# Patient Record
(demographics unavailable — no encounter records)

---

## 2024-11-12 NOTE — PROCEDURE
[FreeTextEntry3] :  PRE-OP DIAGNOSES ICD-10 Diagnosis Codes 	Primary Diagnosis - J34.89 Rhinorrhea  - J34.9 (unspecified disorders of the nose or sinuses) - R09.81 nasal congestion - vasomotor rhinitis - BITH   PROCEDURES PERFORMED:   1.	Destruction of intranasal tissue by radiofrequency, internal approach (CPT-4 05225) 2.	Ablation, soft tissue of inferior turbinates, unilateral or bilateral, by radiofrequency; superficial (CPT-4 93796) 3.        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  DESCRIPTION OF OPERATION: After obtaining informed consent from the patient, Patient was brought to the procedure room Two methods were used to verify patient identity. A time-out was taken as per protocol. Topical decongestant was applied bilaterally intranasally. A zero-degree 4mm rigid endoscope R17 was used to examine the left and right nasal cavities. The nasal valve areas were examined for abnormalities. The inferior and middle turbinates were evaluated. The middle and superior meati and the sphenoethmoid recesses were examined.  Bilateral spray 1:1 Oxymetazoline and 4% lidocaine. sphenopalatine area then injected with 1% lido, 1:200k epi 1 cc each side, drawing back b/l to assure not in the artery   large septal perf.  Starting with the Right side, the treatment area at the point of attachment of the middle turbinate to the lateral wall was visualized. Under nasal endoscopy the radiofrequency stylus previously prepped with conductive media was advanced and placed on the mucosal surface, just anterior to the region of the sphenopalatine foramen located in the posterior aspect of the middle meatus, on the lateral nasal wall.   The radiofrequency stylus was placed with gentle pressure against the treatment zone and activated to induce destruction of the target tissue. After removing the stylus the treated area was endoscopically inspected and a blanched lesion was observed indicating the destruction of the tissue. The radiofrequency stylus was applied two more times in adjacent, non-overlapping locations along the inferior turbinate for each treatment cycle.  Attention was then turned to the left side. As the anesthetic agents had been previously administered and were deemed effective, we were able to proceed directly with the second procedure. The radiofrequency stylus was inspected and re-prepped. and the treatment area at the point of attachment of the middle turbinate to the lateral wall was visualized.  Under nasal endoscopy the radiofrequency stylus previously prepped with conductive media was advanced and placed on the mucosal surface, just anterior to the region of the sphenopalatine foramen located in the posterior aspect of the middle meatus, on the lateral nasal wall.   The radiofrequency stylus was placed with gentle pressure against the treatment zone and activated to induce destruction of the target tissue. After removing the stylus the treated area was endoscopically inspected and a blanched lesion was observed indicating the destruction of the tissue. The radiofrequency stylus was applied two more times in adjacent, non-overlapping locations along the inferior turbinate for each treatment cycle.  The patient tolerated the procedures without any complications. After adequate recovery time, a post-procedure assessment was completed and the patient was returned safely to ambulatory status as per protocol. Post-procedure instructions were provided to patients surrogate and the patient; these were understood and a copy provided prior to discharge. Patient to return to clinic in 7 days or sooner as needed.

## 2024-11-25 NOTE — ASSESSMENT
[FreeTextEntry1] : 64 year old male following up s/p rhinaer on 11/12/24. The patient was debrided bilaterally with rigid suction as a result of nasal crusting. breathing much improved after detriment. Patient should continue to avoid nose blowing, heavy lifting or exercising, and should start a regimen of over the counter nasal saline spray for moisturization of the nasal cavities.  - will follow up in 1 month to assure no scarring, will monitor response  - nasal irrigation  - continue Flonase

## 2024-11-25 NOTE — PROCEDURE
[FreeTextEntry3] : procedure - bilateral endoscopic nasal debridement Dx - crusting Verbal consent obtained - discussed risks and benefits of intervention before proceeding  Bilateral nasal cavities inspected with #0 ridged sinus endoscope. septum appeared midline and turbinates well reduced. bilateral middle meatuses were explored and suction and agitator were used to open ostiums and open any forming scar bands. all sinuses were explored and open at end of procedure

## 2024-11-25 NOTE — CONSULT LETTER
[FreeTextEntry1] : Dear Dr. SIOBHAN LEDEZMA  I had the pleasure of evaluating your patient MARIANNA CHAPA, thank you for allowing us to participate in their care. please see full note detailing our visit below. If you have any questions, please do not hesitate to call me and I would be happy to discuss further.   Augie Villanueva M.D. Attending Physician,   Department of Otolaryngology - Head and Neck Surgery [facility]  Office: [phone] Fax: [phone]

## 2024-11-25 NOTE — HISTORY OF PRESENT ILLNESS
[de-identified] : 64 year old male presents s/p rhinaer on 11/12/24. Doing well, states breathing is a little stuffy at times. Still with runny nose worse on left side. Using saline 2-3 times a day.

## 2024-11-25 NOTE — END OF VISIT
[FreeTextEntry3] : I personally saw and examined  the patient in detail.  I spoke to NANDINI Gomez regarding the assessment and plan of care. I performed the procedures and relevant physical exam.  I have reviewed the above assessment and plan of care and I agree.  I have made changes to the body of the note wherever necessary and appropriate

## 2024-12-12 NOTE — DISCUSSION/SUMMARY
[FreeTextEntry1] : ET with likely underlying exacerbation from psychotropics, trazodone and recent stressors. the reduction in psychotropics have yielded reduction in overall tremor amp/frequency.  Tics are stable  Patient was counseled on the following recommendations:  cont primidone 250/150 cont TPM 50mg qd    guanfacine  to 2 mg ER qd  f/u 4months
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
## 2024-12-12 NOTE — PHYSICAL EXAM
[FreeTextEntry1] : No tongue clicks.  There are no rest tremor. There are mild postural hand tremors and 1+ KT. Spirals have sm amplitude spirals ?myoclonic like. Michael do not decrement. Tone is normal. walks with normal SL

## 2024-12-26 NOTE — ASSESSMENT
[FreeTextEntry1] : 64 year old male following up s/p rhinaer on 11/12/24. The patient was debrided bilaterally with rigid suction as a result of nasal crusting. breathing much improved after detriment. Patient should continue a regimen of over the counter nasal saline spray for moisturization of the nasal cavities.  great improvement on the right, left still with sx, will give some more time  - will follow up in 2 month  -  nasal irrigation  - continue Flonase

## 2024-12-26 NOTE — HISTORY OF PRESENT ILLNESS
[de-identified] : 64 year old male presents s/p rhinaer on 11/12/24. Doing well. Using saline 2-3 times a day.  Right sided sx have now resolved, left side with persistent - still running    breathing is good

## 2024-12-26 NOTE — CONSULT LETTER
[FreeTextEntry1] : Dear Dr. SIOBHAN LEDEZMA  I had the pleasure of evaluating your patient MARIANNA CHAPA, thank you for allowing us to participate in their care. please see full note detailing our visit below. If you have any questions, please do not hesitate to call me and I would be happy to discuss further.   Augie Villanueva M.D. Attending Physician,   Department of Otolaryngology - Head and Neck Surgery [facility]  Office: [phone] Fax: [phone]

## 2024-12-30 NOTE — PHYSICAL EXAM
[FreeTextEntry1] : General\par  Constitutional: alert and in no acute distress. \par  Psychiatric: affect normal, insight and judgment intact.\par  Neurologic: \par  Orientation: oriented to person, place, and time \par  Attention: normal concentrating ability and attention was not decreased. \par  Language: some difficulty recalling names,  NL repeating a phrase, writing a sentence; fluency, comprehension, and reading intact. \par  Fund of knowledge: displays adequate knowledge of personal past history. \par  Cranial Nerves: visual acuity intact bilaterally, visual fields full to confrontation, pupils equal round and reactive to light, extraocular motion intact, facial sensation intact symmetrically, face symmetrical, hearing was intact bilaterally, tongue and palate midline, head turning and shoulder shrug symmetric and there was no tongue deviation with protrusion. \par  Motor: the patient is right hand dominant. \par  muscle tone was normal in all four extremities, muscle strength was normal in all four extremities and normal bulk in all four extremities. \par  Coordination:. normal gait. balance was intact. there was intention tremor present. \par  Deep tendon reflexes: \par  Biceps right 2+. Biceps left 2+.  \par  Triceps right 2+. Triceps left 2+.  \par  Brachioradialis right 2+. Brachioradialis left 2+.  \par  Patella right 2+. Patella left 2+.  \par  Ankle jerk right 2+. Ankle jerk left 2+. \par  Eyes: the sclera and conjunctiva were normal. \par  Neck: the appearance of the neck was normal. \par  Musculoskeletal: no clubbing or cyanosis of the fingernails. \par  Skin: no lesions, rash\par

## 2024-12-30 NOTE — CONSULT LETTER
[Dear  ___] : Dear  [unfilled], [Consult Letter:] : I had the pleasure of evaluating your patient, [unfilled]. [( Thank you for referring [unfilled] for consultation for _____ )] : Thank you for referring [unfilled] for consultation for [unfilled] [Please see my note below.] : Please see my note below. [Consult Closing:] : Thank you very much for allowing me to participate in the care of this patient.  If you have any questions, please do not hesitate to contact me. [Sincerely,] : Sincerely, [FreeTextEntry2] : Lebron Abrams DO\par  Address: [address] [place] [address] [address], [place] [address] [FreeTextEntry3] : Ranulfo Gomes MD, HAYLIE\par  Board Certified: Neurology, Clinical Neurophysiology, Epilepsy\par  , Department of Neurology\par  Epilepsy Fellowship \par  [facility]\par  \par   [DrPeter  ___] : Dr. FONG

## 2024-12-30 NOTE — DISCUSSION/SUMMARY
[FreeTextEntry1] : H/o pituitary surgery, mood issues, tremor, orthostatic hypotension. Some mild cognitive complaints. ?Sz hx, neg w/u around 2019.  Vague hx H/o syncope and vertigo as well. Now s/p stent and PPM.  EEG with LRDA, focal slowing 12/2021 MRI with small microadenoma unchanged from 2017, white matter disease long standing CLL Dx in 2008 Renal CA p resection 2022  Concern for polypharmacy (was on 4 AEDs low dose) Now off VPA, on LTG/TPM already  LTG back to 150mg/d (low therapeutic) Primidone per Dr Quesada for tremor 150/200mg (apparently did not tolerate 250mg bid) Tapered TPM 50mg/d  Caution with wellbutrin with elevated risk of seizures.  Recommend lowest doses tolerated.   Avoid anticholinergic Rx, caution with bupropion and trazadone  Seeing Dr Quesada from movement disorders for tremors  Plavix for WM dz on CT.   x31 min RTC 6-9mo or prn
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
## 2025-02-24 NOTE — PHYSICAL EXAM
[Normal] : mucosa is normal [Midline] : trachea located in midline position [Laryngoscopy Performed] : laryngoscopy was performed, see procedure section for findings [de-identified] : crusting b/l

## 2025-02-24 NOTE — PROCEDURE
[de-identified] : Augie Villanueva M.D. [de-identified] : Procedure performed: Nasal Endoscopy- Diagnostic Pre-op indication(s): nasal congestion Post-op indication(s): nasal congestion Verbal and/or written consent obtained from patient Anterior rhinoscopy insufficient to account for symptoms Scope #: 3, flexible fiber optic telescope The scope was introduced in the nasal passage between the middle and inferior turbinates to exam the inferior portion of the middle meatus and the fontanelle, as well as the maxillary ostia. Next, the scope was passed medically and posteriorly to the middle turbinates to examine the sphenoethmoid recess and the superior turbinate region.  Upon visualization the finders are as follows: Nasal Septum: massive perf mostly posterior  Bilateral - Mucosa: boggy turbinates, Mucous: scant, Polyp: not seen, Inferior Turbinate: boggy, some thick mucous, superior nose and rostrum open.  [de-identified] : Procedure performed: laryngeal Endoscopy- Diagnostic Pre-op/post op indication: dysphonia Verbal and/or written consent obtained from patient, Patient was unable to cooperate with mirror Scope #: 3, flexible fiber optic telescope used  Scope was introduced through the nose passed on the floor of the nose to the nasopharynx and then followed down the soft palate to the lower pharynx. The tongue Base, Larynx, Hypopharynx were examined. Base of tongue was symmetric, vallecular was clear, epiglottis was not deformed, subglottis/ pyriform and posterior pharyngeal walls were clear. No erythema, edema, pooling of secretions, masses or lesions. Airway patent, no foreign body visualized. No glottic/supraglottic edema. True vocal cords, arytenoids, vestibular folds, ventricles, pyriform sinuses, and aryepiglottic folds appear normal bilaterally. Vocal cords mobile with good contact b/l

## 2025-02-24 NOTE — CONSULT LETTER
[FreeTextEntry1] : Dear Dr. SIOBHAN LEDEZMA  I had the pleasure of evaluating your patient MARIANNA CHAPA, thank you for allowing us to participate in their care. please see full note detailing our visit below. If you have any questions, please do not hesitate to call me and I would be happy to discuss further.   Augie Villanueva M.D. Attending Physician,   Department of Otolaryngology - Head and Neck Surgery [facility]  Office: [phone] Fax: [phone]

## 2025-02-24 NOTE — HISTORY OF PRESENT ILLNESS
[de-identified] : 64 year old male presents s/p rhinaer on 11/12/24. Doing well. Using saline 2-3 times a day.  Right sided sx have now resolved, left side with persistent - still running    breathing is good  [FreeTextEntry1] : 64 year old male presents s/p rhinaer on 11/12/24. Still with persisting runny nose. Using saline twice a day. Breathing is generally good.  feels runny nose exactly the same as pre-procedure, has had no change    Also with issue swallowing pills. States used to be able to swallow pills without water, now cannot swallow pills without water without having a pill or two getting stuck. Also been following with GI, was started on karafate liquid form. Getting upper endoscopy next week.

## 2025-02-24 NOTE — ASSESSMENT
[FreeTextEntry1] : 64 year old male with massive septal perf and skull base/sinus sinus surgery in the past following up s/p rhinaer on 11/12/24. On exam, clear secretions b/l. Patient should continue a regimen of over the counter nasal saline spray for moisturization of the nasal cavities.  great improvement on the right, left still with sx, will give some more time  - will follow up in 1 month  - start nasal irrigation with mupirocin  - continue Flonase   Also with difficulty swallowing liquids and pills. No masses or lesions seen on scope.  - follow up with GI, pt has scheduled upper endoscopy

## 2025-03-02 NOTE — HISTORY OF PRESENT ILLNESS
[FreeTextEntry1] : Patient is a 64-year-old man comes in today for follow up. s/p 03/2024 Mitral valve clip  s/p 03/10/2023 PPM placed. 12/2023 Heart attack and 2 additional stents placement.  Kidney cancer: s/p biopsy and percutaneous thermal ablation with IR in April 2022.  CT a/p 1/21/2023: No evidence of recurrence. Post thermal ablation changes right kidney.  PSA 09/2023 0.51ng/mL  BPH/LUTS: remains alfuzosin 10 mg and trospium 20 mg daily. Urinary function remains stable on medications. No dysuria, hematuria or incontinence.  Office renal US today. Images reviewed, no evidence of new or recurrent lesions.

## 2025-04-14 NOTE — PROCEDURE
[de-identified] : Augie Villanueva M.D. [de-identified] : procedure - left  endoscopic nasal  debridement Dx - floor of nose lesion  Verbal consent obtained - discussed risks and benefits of intervention before proceeding  Bilateral nasal cavities inspected with #0 ridged sinus endoscope.subottal perf, midline and turbinates well reduced. bilateral middle meatuses were explored and suction and agitator were used to open ostiums and open any forming scar bands. all sinuses were explored and open at end of procedure left floor of nose at posterior by choana onto soft palate with brown ? fungal ? mucous - suctioned did clear some secreations but brown did not clear, looked fungal. pictures taken  [de-identified] : Procedure performed: laryngeal Endoscopy- Diagnostic Pre-op/post op indication: dysphonia Verbal and/or written consent obtained from patient, Patient was unable to cooperate with mirror Scope #: 3, flexible fiber optic telescope used  Scope was introduced through the nose passed on the floor of the nose to the nasopharynx and then followed down the soft palate to the lower pharynx. The tongue Base, Larynx, Hypopharynx were examined. Base of tongue was symmetric, vallecular was clear, epiglottis was not deformed, subglottis/ pyriform and posterior pharyngeal walls were clear. No erythema, edema, pooling of secretions, masses or lesions. Airway patent, no foreign body visualized. No glottic/supraglottic edema. True vocal cords, arytenoids, vestibular folds, ventricles, pyriform sinuses, and aryepiglottic folds appear normal bilaterally. Vocal cords mobile with good contact b/l

## 2025-04-14 NOTE — CONSULT LETTER
[FreeTextEntry1] : Dear Dr. SIOBHAN LEDEZMA  I had the pleasure of evaluating your patient MARIANNA CHAPA, thank you for allowing us to participate in their care. please see full note detailing our visit below. If you have any questions, please do not hesitate to call me and I would be happy to discuss further.   Augie Villanueva M.D. Attending Physician,   Department of Otolaryngology - Head and Neck Surgery [facility]  Office: [phone] Fax: [phone]

## 2025-04-14 NOTE — ASSESSMENT
[FreeTextEntry1] : 64 year old male with massive septal perf and skull base/sinus sinus surgery in the past following up s/p rhinaer on 11/12/24. On exam, clear secretions b/l, discoloration floor of nose posteriorly onto soft palate and septum - pictures taken.  Patient should continue a regimen of over the counter nasal saline spray for moisturization of the nasal cavities.  great improvement on the right, left still with sx, will give some more time  - stop nasal sprays and washes for now  seems stable, on anticoagulation will consider a biopsy once off plavix for a week, will clear with cards   Also with right sided hearing changes. Benign exam.  - will schedule audio - ear hygiene   Also with difficulty swallowing liquids and pills. No masses or lesions seen on scope.  -  continue to follow with GI

## 2025-04-14 NOTE — PHYSICAL EXAM
[Normal] : mucosa is normal [Midline] : trachea located in midline position [Laryngoscopy Performed] : laryngoscopy was performed, see procedure section for findings [de-identified] : crusting b/l

## 2025-04-14 NOTE — PHYSICAL EXAM
[Normal] : mucosa is normal [Midline] : trachea located in midline position [Laryngoscopy Performed] : laryngoscopy was performed, see procedure section for findings [de-identified] : crusting b/l

## 2025-04-14 NOTE — PROCEDURE
[de-identified] : Augie Villanueva M.D. [de-identified] : procedure - left  endoscopic nasal  debridement Dx - floor of nose lesion  Verbal consent obtained - discussed risks and benefits of intervention before proceeding  Bilateral nasal cavities inspected with #0 ridged sinus endoscope.subottal perf, midline and turbinates well reduced. bilateral middle meatuses were explored and suction and agitator were used to open ostiums and open any forming scar bands. all sinuses were explored and open at end of procedure left floor of nose at posterior by choana onto soft palate with brown ? fungal ? mucous - suctioned did clear some secreations but brown did not clear, looked fungal. pictures taken  [de-identified] : Procedure performed: laryngeal Endoscopy- Diagnostic Pre-op/post op indication: dysphonia Verbal and/or written consent obtained from patient, Patient was unable to cooperate with mirror Scope #: 3, flexible fiber optic telescope used  Scope was introduced through the nose passed on the floor of the nose to the nasopharynx and then followed down the soft palate to the lower pharynx. The tongue Base, Larynx, Hypopharynx were examined. Base of tongue was symmetric, vallecular was clear, epiglottis was not deformed, subglottis/ pyriform and posterior pharyngeal walls were clear. No erythema, edema, pooling of secretions, masses or lesions. Airway patent, no foreign body visualized. No glottic/supraglottic edema. True vocal cords, arytenoids, vestibular folds, ventricles, pyriform sinuses, and aryepiglottic folds appear normal bilaterally. Vocal cords mobile with good contact b/l

## 2025-04-14 NOTE — HISTORY OF PRESENT ILLNESS
[de-identified] : 64 year old male presents s/p rhinaer on 11/12/24. Doing well. Using saline 2-3 times a day.  Right sided sx have now resolved, left side with persistent - still running    breathing is good   64 year old male presents s/p rhinaer on 11/12/24. Still with persisting runny nose. Using saline twice a day. Breathing is generally good.  feels runny nose exactly the same as pre-procedure, has had no change    Also with issue swallowing pills. States used to be able to swallow pills without water, now cannot swallow pills without water without having a pill or two getting stuck. Also been following with GI, was started on karafate liquid form. Getting upper endoscopy next week.   Patient is following up after trying budesonide washes with mupirocin for over a month. Unsure if it helped, still with persisting runny nose. Breathing is good. A little bit more stuffy last couple days but otherwise doing well.  Taking liquid nystatin for fungus found on esophagus during upper endoscopy exam.   [FreeTextEntry1] : Patient is following up for evaluation of nose after treating with fluconazole washes. States after uses it feels like has to spit up some mucus. States swallowing issues are starting to come back, feels like has difficulty swallowing pills again.  Did see GI and was put on nystatin.   Hearing sounds more strongly on the right than the left ear for last month. no Vertigo, tinnitus, pain, drainage or facial weakness. Last hearing test was 02/26/25.

## 2025-04-14 NOTE — HISTORY OF PRESENT ILLNESS
[de-identified] : 64 year old male presents s/p rhinaer on 11/12/24. Doing well. Using saline 2-3 times a day.  Right sided sx have now resolved, left side with persistent - still running    breathing is good   64 year old male presents s/p rhinaer on 11/12/24. Still with persisting runny nose. Using saline twice a day. Breathing is generally good.  feels runny nose exactly the same as pre-procedure, has had no change    Also with issue swallowing pills. States used to be able to swallow pills without water, now cannot swallow pills without water without having a pill or two getting stuck. Also been following with GI, was started on karafate liquid form. Getting upper endoscopy next week.   Patient is following up after trying budesonide washes with mupirocin for over a month. Unsure if it helped, still with persisting runny nose. Breathing is good. A little bit more stuffy last couple days but otherwise doing well.  Taking liquid nystatin for fungus found on esophagus during upper endoscopy exam.   [FreeTextEntry1] : Patient is following up for evaluation of nose after treating with fluconazole washes. States after uses it feels like has to spit up some mucus. States swallowing issues are starting to come back, feels like has difficulty swallowing pills again.  Did see GI and was put on nystatin.   Hearing sounds more strongly on the right than the left ear for last month. no Vertigo, tinnitus, pain, drainage or facial weakness. Last hearing test was 02/26/25.

## 2025-04-14 NOTE — CONSULT LETTER
[FreeTextEntry1] : Dear Dr. SIOBHAN LEDEZMA  I had the pleasure of evaluating your patient MARIANNA CHAPA, thank you for allowing us to participate in their care. please see full note detailing our visit below. If you have any questions, please do not hesitate to call me and I would be happy to discuss further.   Augie Villanueva M.D. Attending Physician,   Department of Otolaryngology - Head and Neck Surgery [facility]  Office: [phone] Fax: [phone]

## 2025-04-29 NOTE — ASSESSMENT
[FreeTextEntry1] : 64 year old male with massive septal perf and skull base/sinus sinus surgery in the past following up s/p rhinaer on 11/12/24. On exam, clear secretions b/l, discoloration floor of nose posteriorly onto soft palate and septum - pictures taken.  - biopsy taken today 04/29/25, will call to discuss results  Patient should continue a regimen of over the counter nasal saline spray for moisturization of the nasal cavities.  great improvement on the right, left still with sx, will give some more time  - flonase and azelastine     Also with right sided hearing changes. Benign exam.  - audio performed and reviewed today 04/29/25  Mild bilateral sensory neural hearing loss on audiological evaluation. At this point clinically not severe and it does not significant limitation in function, discuses what to look for in regards to signs of worsening hearing loss that may require re-evaluation. Also discussed behavioral techniques and environmental controls for improving function . They will follow up as needed or if hearing gets worse. discussed physiology and Tx for hyperacusis

## 2025-04-29 NOTE — PROCEDURE
[FreeTextEntry3] : Procedure: endoscopic nasal biopsy/debridment                                 Indication: nasal       Clean contaminated  EBL : Minimal   Anesthesia: Local   Indication: This is a patient with a nasal discoloration who after discussion of risks, benefits and alternatives including but not limited to bleeding, infection, local numbness/nerve injury, scarring, misdiagnosis, and persistent symptoms elected to proceed with the operation.  Procedure: Patient was taken to the operative suite, after appropriate positioning, verification and timeout protocol the the area was topicalized with 4% lido and afrin  After adequate time for anesthesia and vascular constriction, cup forceps were used to get a representative sample from several spots - floor of nose, inf post septum and dorsal soft palate minimal bleeding . Patient tolerated procedure well with no complications.    They were instructed to wash out the mouth after meals, and call if there was any bleeding it was not quickly controlled with pressure or any other issues. [de-identified] : Augie Villanueva M.D. [de-identified] : procedure - left  endoscopic nasal  debridement Dx - floor of nose lesion  Verbal consent obtained - discussed risks and benefits of intervention before proceeding  Bilateral nasal cavities inspected with #0 ridged sinus endoscope.subottal perf, midline and turbinates well reduced. bilateral middle meatuses were explored and suction and agitator were used to open ostiums and open any forming scar bands. all sinuses were explored and open at end of procedure left floor of nose at posterior by choana onto soft palate with brown ? fungal ? mucous - suctioned did clear some secreations but brown did not clear, looked fungal.

## 2025-04-29 NOTE — HISTORY OF PRESENT ILLNESS
[de-identified] : 64 year old male presents s/p rhinaer on 11/12/24. Still with persisting runny nose. Using saline twice a day. Breathing is generally good.  feels runny nose exactly the same as pre-procedure, has had no change    Also with issue swallowing pills. States used to be able to swallow pills without water, now cannot swallow pills without water without having a pill or two getting stuck. Also been following with GI, was started on karafate liquid form. Getting upper endoscopy next week.   Patient is following up after trying budesonide washes with mupirocin for over a month. Unsure if it helped, still with persisting runny nose. Breathing is good. A little bit more stuffy last couple days but otherwise doing well.  Taking liquid nystatin for fungus found on esophagus during upper endoscopy exam.   [FreeTextEntry1] : Patient is following up for evaluation of nose after treating with fluconazole washes. States after uses it feels like has to spit up some mucus. occ pressure. Using Flonase and azelastine.   completed nystatin swallowing has improved   Hearing sounds more strongly on the right than the left ear for last month and a half, did not get the audio we ordered. feels it is stable. no Vertigo, tinnitus, pain, drainage or facial weakness. Last hearing test was 02/26/25.

## 2025-04-29 NOTE — PHYSICAL EXAM
[Normal] : mucosa is normal [Midline] : trachea located in midline position [Laryngoscopy Performed] : laryngoscopy was performed, see procedure section for findings [de-identified] : crusting b/l

## 2025-04-29 NOTE — CONSULT LETTER
[FreeTextEntry1] : Dear Dr. SIOBHAN LEDEZMA  I had the pleasure of evaluating your patient MARIANNA CHAPA, thank you for allowing us to participate in their care. please see full note detailing our visit below. If you have any questions, please do not hesitate to call me and I would be happy to discuss further.   Augie Villanueva M.D. Attending Physician,   Department of Otolaryngology - Head and Neck Surgery [facility]  Office: [phone] Fax: [phone]

## 2025-06-25 NOTE — HISTORY OF PRESENT ILLNESS
[FreeTextEntry1] : Patient presents for urgent f/u after a hospitalization for cardiac bypass with pacemaker and defibrillator.  While in the hospital he had nausea and vomiting which they treated with metoclopramide. He still takes it 1-2 daily scheduled. He is unsure if the nausea is still occuring   He has been at [facility] for the last 3 weeks working with PT 2 weeks ago he started to have swaying movements in his torse which makes him dizzy and tongue movements different from his usual tics. The do not interfere with eating or drinking.  What prompted him to initially go to the hospital was changes in his swallowing and GI upset He is working with SLP and on a bite sized diet.  intuniv (guanfacine) 2mg ER qd was discontinued either while hospitalized or at rehab.     Tremor Meds: primidone 250mg qAM and 150 qpm TPM 50mg qd midodrine 5mg BID plavix   prior tremor meds inderal neurontin  intuniv (guanfacine) 2mg ER qd  sz meds LTG  TPM  Neuropsych meds welbutrin 150mg daily lexapro  10mg daily trazodone 50mg daily

## 2025-06-25 NOTE — PHYSICAL EXAM
[General Appearance - Alert] : alert [Oriented To Time, Place, And Person] : oriented to person, place, and time [FreeTextEntry1] : There are no rest tremor. There are mild postural hand tremors and 1+ KT.  Michael do not decrement. Tone is normal. Pushes to stand and need contact guard to walk tongue protrusion and oral movements noted. back and forth truncal rocking movements observed.

## 2025-06-25 NOTE — DISCUSSION/SUMMARY
[FreeTextEntry1] : ET with tics patient presents for urgent f/u. He was hospitalized for GI upset/ changes in swallow, found to have elevated troponins. He underwent CABG with pacemaker placement. While inpatient he experienced nausea and vomiting which was treated with metoclopramide. He is still taking it in rehab where he has stayed for the last 3 weeks. Unsure if he still needs it.   2 weeks ago he began to have rocking truncal movements and tongue protrusion. 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  Pt examined with Dr Quesada   Patient was counseled on the following recommendations: D/c reglan immediately and switch to zofran if needed. Written instructions sent to rehab.  f/u 4-6 weeks

## 2025-07-27 NOTE — DISCUSSION/SUMMARY
[FreeTextEntry1] : ET with tics patient presents for follow up his tardive movements markedly improved since his last exam. Some residual movement including putting his head down and chewing. He does not find them bothersome. Also not bothered by tics.  Advised it may take up to 1 year to completely wash out effects of reglan   f/u 3 months

## 2025-07-27 NOTE — PHYSICAL EXAM
[General Appearance - Alert] : alert [Oriented To Time, Place, And Person] : oriented to person, place, and time [FreeTextEntry1] : There are no rest tremor.  There are mild postural hand tremors and 1+ KT.   Michael do not decrement. Tone is normal.  Arises with arms crossed, normal initiation, SL and arm swing.  mild chewing like motion ocassionally observed

## 2025-07-27 NOTE — HISTORY OF PRESENT ILLNESS
[FreeTextEntry1] : Patient returns for follow up. He was discharged home from rehab 2 weeks ago. Reportedly stopped Reglan immediately after his last appointment. A few days later his torso movements stopped. 1 week later his mouth movements improved significantly. Reports his head bows forward when he is not focused. For example when he is in the waiting room or stopped at a light he notices his head is down. States his tics are under control despite stopping guanfacine while inpatient. His tremors are the same. Denies significant interference with ADLs. Ambulates with a walker but does not feel imbalance.  No recent falls  Tremor Meds: primidone 250mg qAM and 150 qpm TPM 50mg qd midodrine 5mg BID plavix   prior tremor meds inderal neurontin  intuniv (guanfacine) 2mg ER qd  sz meds LTG  TPM  Neuropsych meds welbutrin 150mg daily lexapro  10mg daily trazodone 50mg daily